# Patient Record
Sex: FEMALE | Race: WHITE
[De-identification: names, ages, dates, MRNs, and addresses within clinical notes are randomized per-mention and may not be internally consistent; named-entity substitution may affect disease eponyms.]

---

## 2021-03-24 ENCOUNTER — HOSPITAL ENCOUNTER (OUTPATIENT)
Age: 31
End: 2021-03-24
Payer: MEDICAID

## 2021-03-24 DIAGNOSIS — R20.2: Primary | ICD-10-CM

## 2021-03-24 PROCEDURE — 95886 MUSC TEST DONE W/N TEST COMP: CPT

## 2021-03-24 PROCEDURE — 95913 NRV CNDJ TEST 13/> STUDIES: CPT

## 2023-06-12 ENCOUNTER — OFFICE VISIT (OUTPATIENT)
Dept: PRIMARY CARE | Facility: CLINIC | Age: 33
End: 2023-06-12
Payer: MEDICAID

## 2023-06-12 ENCOUNTER — LAB (OUTPATIENT)
Dept: LAB | Facility: LAB | Age: 33
End: 2023-06-12
Payer: MEDICAID

## 2023-06-12 VITALS
SYSTOLIC BLOOD PRESSURE: 132 MMHG | HEIGHT: 66 IN | DIASTOLIC BLOOD PRESSURE: 78 MMHG | BODY MASS INDEX: 27.16 KG/M2 | WEIGHT: 169 LBS | HEART RATE: 96 BPM

## 2023-06-12 DIAGNOSIS — F41.9 ANXIETY: ICD-10-CM

## 2023-06-12 DIAGNOSIS — R10.84 GENERALIZED ABDOMINAL PAIN: ICD-10-CM

## 2023-06-12 DIAGNOSIS — M47.22 OSTEOARTHRITIS OF SPINE WITH RADICULOPATHY, CERVICAL REGION: ICD-10-CM

## 2023-06-12 DIAGNOSIS — R20.2 ARM PARESTHESIA, LEFT: ICD-10-CM

## 2023-06-12 DIAGNOSIS — M25.50 ARTHRALGIA, UNSPECIFIED JOINT: Primary | ICD-10-CM

## 2023-06-12 DIAGNOSIS — K21.9 GASTROESOPHAGEAL REFLUX DISEASE WITHOUT ESOPHAGITIS: ICD-10-CM

## 2023-06-12 DIAGNOSIS — M25.50 ARTHRALGIA, UNSPECIFIED JOINT: ICD-10-CM

## 2023-06-12 LAB
ALANINE AMINOTRANSFERASE (SGPT) (U/L) IN SER/PLAS: 13 U/L (ref 7–45)
ALBUMIN (G/DL) IN SER/PLAS: 4.7 G/DL (ref 3.4–5)
ALKALINE PHOSPHATASE (U/L) IN SER/PLAS: 53 U/L (ref 33–110)
ANION GAP IN SER/PLAS: 11 MMOL/L (ref 10–20)
ASPARTATE AMINOTRANSFERASE (SGOT) (U/L) IN SER/PLAS: 15 U/L (ref 9–39)
BACTERIA, URINE: ABNORMAL /HPF
BASOPHILS (10*3/UL) IN BLOOD BY AUTOMATED COUNT: 0.05 X10E9/L (ref 0–0.1)
BASOPHILS/100 LEUKOCYTES IN BLOOD BY AUTOMATED COUNT: 0.7 % (ref 0–2)
BILIRUBIN TOTAL (MG/DL) IN SER/PLAS: 0.4 MG/DL (ref 0–1.2)
CALCIUM (MG/DL) IN SER/PLAS: 9.5 MG/DL (ref 8.6–10.3)
CARBON DIOXIDE, TOTAL (MMOL/L) IN SER/PLAS: 29 MMOL/L (ref 21–32)
CHLORIDE (MMOL/L) IN SER/PLAS: 101 MMOL/L (ref 98–107)
COBALAMIN (VITAMIN B12) (PG/ML) IN SER/PLAS: 332 PG/ML (ref 211–911)
CREATININE (MG/DL) IN SER/PLAS: 0.72 MG/DL (ref 0.5–1.05)
EOSINOPHILS (10*3/UL) IN BLOOD BY AUTOMATED COUNT: 0.05 X10E9/L (ref 0–0.7)
EOSINOPHILS/100 LEUKOCYTES IN BLOOD BY AUTOMATED COUNT: 0.7 % (ref 0–6)
ERYTHROCYTE DISTRIBUTION WIDTH (RATIO) BY AUTOMATED COUNT: 12.3 % (ref 11.5–14.5)
ERYTHROCYTE MEAN CORPUSCULAR HEMOGLOBIN CONCENTRATION (G/DL) BY AUTOMATED: 33.7 G/DL (ref 32–36)
ERYTHROCYTE MEAN CORPUSCULAR VOLUME (FL) BY AUTOMATED COUNT: 95 FL (ref 80–100)
ERYTHROCYTES (10*6/UL) IN BLOOD BY AUTOMATED COUNT: 4.11 X10E12/L (ref 4–5.2)
GFR FEMALE: >90 ML/MIN/1.73M2
GLUCOSE (MG/DL) IN SER/PLAS: 80 MG/DL (ref 74–99)
HEMATOCRIT (%) IN BLOOD BY AUTOMATED COUNT: 39.2 % (ref 36–46)
HEMOGLOBIN (G/DL) IN BLOOD: 13.2 G/DL (ref 12–16)
IMMATURE GRANULOCYTES/100 LEUKOCYTES IN BLOOD BY AUTOMATED COUNT: 0.3 % (ref 0–0.9)
LEUKOCYTES (10*3/UL) IN BLOOD BY AUTOMATED COUNT: 7.4 X10E9/L (ref 4.4–11.3)
LYMPHOCYTES (10*3/UL) IN BLOOD BY AUTOMATED COUNT: 2.59 X10E9/L (ref 1.2–4.8)
LYMPHOCYTES/100 LEUKOCYTES IN BLOOD BY AUTOMATED COUNT: 34.9 % (ref 13–44)
MONOCYTES (10*3/UL) IN BLOOD BY AUTOMATED COUNT: 0.27 X10E9/L (ref 0.1–1)
MONOCYTES/100 LEUKOCYTES IN BLOOD BY AUTOMATED COUNT: 3.6 % (ref 2–10)
MUCUS, URINE: ABNORMAL /LPF
NEUTROPHILS (10*3/UL) IN BLOOD BY AUTOMATED COUNT: 4.44 X10E9/L (ref 1.2–7.7)
NEUTROPHILS/100 LEUKOCYTES IN BLOOD BY AUTOMATED COUNT: 59.8 % (ref 40–80)
PLATELETS (10*3/UL) IN BLOOD AUTOMATED COUNT: 310 X10E9/L (ref 150–450)
POTASSIUM (MMOL/L) IN SER/PLAS: 3.8 MMOL/L (ref 3.5–5.3)
PROTEIN TOTAL: 7 G/DL (ref 6.4–8.2)
RBC, URINE: ABNORMAL /HPF (ref 0–5)
RENAL EPITHELIAL CELLS, URINE: <1 /HPF
SODIUM (MMOL/L) IN SER/PLAS: 137 MMOL/L (ref 136–145)
SQUAMOUS EPITHELIAL CELLS, URINE: 6 /HPF
THYROTROPIN (MIU/L) IN SER/PLAS BY DETECTION LIMIT <= 0.05 MIU/L: 1.49 MIU/L (ref 0.44–3.98)
UREA NITROGEN (MG/DL) IN SER/PLAS: 13 MG/DL (ref 6–23)
WBC, URINE: 1 /HPF (ref 0–5)

## 2023-06-12 PROCEDURE — 81001 URINALYSIS AUTO W/SCOPE: CPT

## 2023-06-12 PROCEDURE — 86039 ANTINUCLEAR ANTIBODIES (ANA): CPT

## 2023-06-12 PROCEDURE — 86431 RHEUMATOID FACTOR QUANT: CPT

## 2023-06-12 PROCEDURE — 85025 COMPLETE CBC W/AUTO DIFF WBC: CPT

## 2023-06-12 PROCEDURE — 83036 HEMOGLOBIN GLYCOSYLATED A1C: CPT

## 2023-06-12 PROCEDURE — 80053 COMPREHEN METABOLIC PANEL: CPT

## 2023-06-12 PROCEDURE — 86038 ANTINUCLEAR ANTIBODIES: CPT

## 2023-06-12 PROCEDURE — 86225 DNA ANTIBODY NATIVE: CPT

## 2023-06-12 PROCEDURE — 82607 VITAMIN B-12: CPT

## 2023-06-12 PROCEDURE — 86235 NUCLEAR ANTIGEN ANTIBODY: CPT

## 2023-06-12 PROCEDURE — 36415 COLL VENOUS BLD VENIPUNCTURE: CPT

## 2023-06-12 PROCEDURE — 1036F TOBACCO NON-USER: CPT | Performed by: INTERNAL MEDICINE

## 2023-06-12 PROCEDURE — 86200 CCP ANTIBODY: CPT

## 2023-06-12 PROCEDURE — 99204 OFFICE O/P NEW MOD 45 MIN: CPT | Performed by: INTERNAL MEDICINE

## 2023-06-12 PROCEDURE — 84443 ASSAY THYROID STIM HORMONE: CPT

## 2023-06-12 PROCEDURE — 87086 URINE CULTURE/COLONY COUNT: CPT

## 2023-06-12 RX ORDER — GABAPENTIN 100 MG/1
100 CAPSULE ORAL 3 TIMES DAILY
COMMUNITY
Start: 2023-02-03 | End: 2023-07-24 | Stop reason: ALTCHOICE

## 2023-06-12 RX ORDER — GABAPENTIN 100 MG/1
100 CAPSULE ORAL 3 TIMES DAILY
Qty: 90 CAPSULE | Refills: 5 | Status: CANCELLED | OUTPATIENT
Start: 2023-06-12

## 2023-06-12 RX ORDER — OMEPRAZOLE 40 MG/1
40 CAPSULE, DELAYED RELEASE ORAL
Qty: 30 CAPSULE | Refills: 11 | Status: SHIPPED | OUTPATIENT
Start: 2023-06-12 | End: 2024-05-16

## 2023-06-12 RX ORDER — PREGABALIN 100 MG/1
100 CAPSULE ORAL 3 TIMES DAILY
Qty: 90 CAPSULE | Refills: 2 | Status: SHIPPED | OUTPATIENT
Start: 2023-06-12 | End: 2023-09-26

## 2023-06-12 RX ORDER — SERTRALINE HYDROCHLORIDE 100 MG/1
100 TABLET, FILM COATED ORAL 2 TIMES DAILY
COMMUNITY
Start: 2023-02-03 | End: 2023-06-12 | Stop reason: SDUPTHER

## 2023-06-12 RX ORDER — CYCLOBENZAPRINE HCL 10 MG
10 TABLET ORAL 3 TIMES DAILY PRN
COMMUNITY
Start: 2023-02-03 | End: 2023-06-12 | Stop reason: SDUPTHER

## 2023-06-12 RX ORDER — SERTRALINE HYDROCHLORIDE 100 MG/1
200 TABLET, FILM COATED ORAL DAILY
Qty: 60 TABLET | Refills: 1 | Status: SHIPPED | OUTPATIENT
Start: 2023-06-12 | End: 2023-09-26

## 2023-06-12 RX ORDER — CYCLOBENZAPRINE HCL 10 MG
10 TABLET ORAL 3 TIMES DAILY PRN
Qty: 90 TABLET | Refills: 3 | Status: SHIPPED | OUTPATIENT
Start: 2023-06-12 | End: 2023-10-27

## 2023-06-12 ASSESSMENT — ENCOUNTER SYMPTOMS
UNEXPECTED WEIGHT CHANGE: 0
COUGH: 0
DIARRHEA: 0
NERVOUS/ANXIOUS: 0
CONSTIPATION: 0
WEAKNESS: 0
RHINORRHEA: 0
ABDOMINAL PAIN: 1
SORE THROAT: 0
HALLUCINATIONS: 0
DYSURIA: 0
FLANK PAIN: 1
ARTHRALGIAS: 0
FEVER: 0
BACK PAIN: 0
TROUBLE SWALLOWING: 0
ABDOMINAL DISTENTION: 0
NUMBNESS: 0
SHORTNESS OF BREATH: 0
FREQUENCY: 0
APPETITE CHANGE: 0
BLOOD IN STOOL: 0
HEADACHES: 0
NAUSEA: 1
FATIGUE: 0
VOMITING: 0
SINUS PRESSURE: 0
NECK PAIN: 0
EYE DISCHARGE: 0
DIZZINESS: 0
ACTIVITY CHANGE: 0

## 2023-06-12 ASSESSMENT — PATIENT HEALTH QUESTIONNAIRE - PHQ9
2. FEELING DOWN, DEPRESSED OR HOPELESS: NOT AT ALL
SUM OF ALL RESPONSES TO PHQ9 QUESTIONS 1 AND 2: 0
1. LITTLE INTEREST OR PLEASURE IN DOING THINGS: NOT AT ALL

## 2023-06-12 NOTE — PROGRESS NOTES
"Subjective   Patient ID: Antionette Curtis is a 33 y.o. female who presents for Establish Care ([facility]).  HPI    Patient is a 33 y.o. female patient who is here today to establish care with a chief complaint of nausea. She reports that she has been having nausea that comes and goes, more frequent if she drinks more caffeine, has tried changing her diet.   Patient reports some flank pain.   She tried cutting back on caffeine which did help but did resolve.     She has chronic back pain, currently on flexeril, and gabpanetin.  Patient had bene having issues with dropping things. Patient had an MRI, sees chiropractoer. She staets she feels like the gabapentin gives her RLS.   She did see Dr Rosas, recommend shots but she declined.   She did complete a course of physical therapy.     Review of Systems   Constitutional:  Negative for activity change, appetite change, fatigue, fever and unexpected weight change.   HENT:  Negative for congestion, ear discharge, ear pain, nosebleeds, postnasal drip, rhinorrhea, sinus pressure, sneezing, sore throat, tinnitus and trouble swallowing.    Eyes:  Negative for discharge.   Respiratory:  Negative for cough and shortness of breath.    Cardiovascular:  Negative for chest pain.   Gastrointestinal:  Positive for abdominal pain and nausea. Negative for abdominal distention, blood in stool, constipation, diarrhea and vomiting.   Endocrine: Negative for cold intolerance.   Genitourinary:  Positive for flank pain. Negative for dysuria and frequency.   Musculoskeletal:  Negative for arthralgias, back pain and neck pain.   Skin:  Negative for rash.   Neurological:  Negative for dizziness, weakness, numbness and headaches.   Psychiatric/Behavioral:  Negative for hallucinations. The patient is not nervous/anxious.        Objective   /78 (BP Location: Left arm, Patient Position: Sitting, BP Cuff Size: Adult)   Pulse 96   Ht 1.676 m (5' 6\")   Wt 76.7 kg (169 lb)   BMI 27.28 kg/m² "     Physical Exam  Constitutional:       General: She is not in acute distress.     Appearance: Normal appearance. She is not toxic-appearing.   HENT:      Head: Normocephalic and atraumatic.      Nose: Nose normal.      Mouth/Throat:      Mouth: Mucous membranes are moist.      Pharynx: Oropharynx is clear.   Eyes:      Extraocular Movements: Extraocular movements intact.      Conjunctiva/sclera: Conjunctivae normal.      Pupils: Pupils are equal, round, and reactive to light.   Cardiovascular:      Rate and Rhythm: Normal rate and regular rhythm.      Heart sounds: No murmur heard.     No friction rub. No gallop.   Pulmonary:      Effort: Pulmonary effort is normal.      Breath sounds: Normal breath sounds.   Abdominal:      General: Bowel sounds are normal. There is no distension.      Palpations: Abdomen is soft. There is no mass.      Tenderness: There is no abdominal tenderness. There is no guarding.   Musculoskeletal:      Cervical back: Normal range of motion.   Skin:     General: Skin is warm and dry.   Neurological:      General: No focal deficit present.      Mental Status: She is alert and oriented to person, place, and time.   Psychiatric:         Mood and Affect: Mood normal.         Thought Content: Thought content normal.         Judgment: Judgment normal.           Assessment/Plan   Problem List Items Addressed This Visit          Nervous    Osteoarthritis of spine with radiculopathy, cervical region    Relevant Medications    pregabalin (Lyrica) 100 mg capsule    Arm paresthesia, left    Relevant Orders    Vitamin B12    Hemoglobin A1C       Digestive    Gastroesophageal reflux disease without esophagitis    Relevant Medications    omeprazole (PriLOSEC) 40 mg DR capsule       Musculoskeletal    Arthralgia - Primary    Relevant Medications    cyclobenzaprine (Flexeril) 10 mg tablet    Other Relevant Orders    HALLEY with Reflex to SKYLA    Rheumatoid Factor    Citrulline Antibody, IgG       Other     Anxiety    Relevant Medications    sertraline (Zoloft) 100 mg tablet     Other Visit Diagnoses       Generalized abdominal pain        Relevant Orders    Comprehensive Metabolic Panel    CBC and Auto Differential    Urinalysis Microscopic Only    Urine Culture          Abd pain, flank pain   - suspect GERD, possibly PUD, gastritis   - will check cbc, cmp  - try omeprazole 40mg po daily     2. Chronic neck pain with radiculopathy  - reviewed MRI   - pt says she had EMG, AND SAW dR Rosas, declined cortisone shots   - declines pain management referral   - continue flexeril   - will try lyrica 100mg po tid instead of gabapentin     3. Anxiety, stable   - continue zoloft 100mg at bedtime     4. Pt states iud NEEDS removed in Jan, recommend gyn, reports periods are starting to come back, explained that this is normal as the hormone reaches the end of its lifespan     5. Back pain with radiculopathy   - consider imaging and pt but will address above issues first   - check rupal, rf,m anti-ccp, tsh    6. Follow up  in 1 mo with labs   Final diagnoses:   [M25.50] Arthralgia, unspecified joint   [M47.22] Osteoarthritis of spine with radiculopathy, cervical region   [F41.9] Anxiety   [R10.84] Generalized abdominal pain   [R20.2] Arm paresthesia, left   [K21.9] Gastroesophageal reflux disease without esophagitis

## 2023-06-13 LAB
CITRULLINE ANTIBODY, IGG: <1 U/ML
ESTIMATED AVERAGE GLUCOSE FOR HBA1C: 103 MG/DL
HEMOGLOBIN A1C/HEMOGLOBIN TOTAL IN BLOOD: 5.2 %
RHEUMATOID FACTOR (IU/ML) IN SERUM OR PLASMA: <10 IU/ML (ref 0–15)
URINE CULTURE: NORMAL

## 2023-06-14 LAB
ANA PATTERN: ABNORMAL
ANA TITER: ABNORMAL
ANTI-CENTROMERE: <0.2 AI
ANTI-CHROMATIN: <0.2 AI
ANTI-DNA (DS): 1 IU/ML
ANTI-JO-1 IGG: <0.2 AI
ANTI-NUCLEAR ANTIBODY (ANA): POSITIVE
ANTI-RIBOSOMAL P: <0.2 AI
ANTI-RNP: <0.2 AI
ANTI-SCL-70: <0.2 AI
ANTI-SM/RNP: <0.2 AI
ANTI-SM: 0.2 AI
ANTI-SSA: 0.2 AI
ANTI-SSB: <0.2 AI

## 2023-07-24 ENCOUNTER — OFFICE VISIT (OUTPATIENT)
Dept: PRIMARY CARE | Facility: CLINIC | Age: 33
End: 2023-07-24
Payer: MEDICAID

## 2023-07-24 VITALS
HEIGHT: 66 IN | HEART RATE: 99 BPM | DIASTOLIC BLOOD PRESSURE: 80 MMHG | WEIGHT: 173 LBS | BODY MASS INDEX: 27.8 KG/M2 | SYSTOLIC BLOOD PRESSURE: 128 MMHG

## 2023-07-24 DIAGNOSIS — G89.29 CHRONIC BILATERAL LOW BACK PAIN WITH BILATERAL SCIATICA: ICD-10-CM

## 2023-07-24 DIAGNOSIS — F41.9 ANXIETY: ICD-10-CM

## 2023-07-24 DIAGNOSIS — M47.22 OSTEOARTHRITIS OF SPINE WITH RADICULOPATHY, CERVICAL REGION: ICD-10-CM

## 2023-07-24 DIAGNOSIS — R20.2 ARM PARESTHESIA, LEFT: ICD-10-CM

## 2023-07-24 DIAGNOSIS — M54.42 CHRONIC BILATERAL LOW BACK PAIN WITH BILATERAL SCIATICA: ICD-10-CM

## 2023-07-24 DIAGNOSIS — M54.41 CHRONIC BILATERAL LOW BACK PAIN WITH BILATERAL SCIATICA: ICD-10-CM

## 2023-07-24 DIAGNOSIS — K21.9 GASTROESOPHAGEAL REFLUX DISEASE WITHOUT ESOPHAGITIS: Primary | ICD-10-CM

## 2023-07-24 PROCEDURE — 1036F TOBACCO NON-USER: CPT | Performed by: INTERNAL MEDICINE

## 2023-07-24 PROCEDURE — 99213 OFFICE O/P EST LOW 20 MIN: CPT | Performed by: INTERNAL MEDICINE

## 2023-07-24 ASSESSMENT — ENCOUNTER SYMPTOMS
BACK PAIN: 1
NUMBNESS: 1

## 2023-07-24 NOTE — PROGRESS NOTES
"Subjective   Patient ID: Antionette Curtis is a 33 y.o. female who presents for Follow-up (1 month).  HPI  Patient is here today for 1 mo follow up   Pt had her bloodwork completed, all wnl, except positive RUPAL but antibodies negative.   Abd pain is improved on the omeprazole.     Review of Systems   Musculoskeletal:  Positive for back pain.   Neurological:  Positive for numbness.       Objective   /80 (BP Location: Left arm, Patient Position: Sitting, BP Cuff Size: Adult)   Pulse 99   Ht 1.676 m (5' 6\")   Wt 78.5 kg (173 lb)   BMI 27.92 kg/m²     Physical Exam  Constitutional:       General: She is not in acute distress.     Appearance: Normal appearance.   Cardiovascular:      Rate and Rhythm: Normal rate and regular rhythm.      Pulses: Normal pulses.   Pulmonary:      Effort: Pulmonary effort is normal. No respiratory distress.      Breath sounds: Normal breath sounds.   Neurological:      Mental Status: She is alert.   Psychiatric:         Mood and Affect: Mood normal.         Behavior: Behavior normal.         Thought Content: Thought content normal.           Assessment/Plan   Problem List Items Addressed This Visit       Osteoarthritis of spine with radiculopathy, cervical region    Anxiety    Arm paresthesia, left    Gastroesophageal reflux disease without esophagitis - Primary     Abd pain, flank pain, better   - suspect GERD, possibly PUD, gastritis   - labs wnl   - continue omeprazole 40mg po daily      2. Chronic neck pain with radiculopathy  - reviewed MRI   - pt says she had EMG, AND SAW dR Rosas, declined cortisone shots   - declines pain management referral   - continue flexeril   - improved on lyrica 100mg po tid      3. Anxiety, stable   - continue zoloft 100mg at bedtime      4.. Back pain with radiculopathy   - rupal pos, rf negative   - continue flexeril and lyrica   - xrays ordered    - can add once daily aleve prn   Final diagnoses:   [K21.9] Gastroesophageal reflux disease without " esophagitis   [F41.9] Anxiety   [M47.22] Osteoarthritis of spine with radiculopathy, cervical region   [R20.2] Arm paresthesia, left

## 2023-09-26 DIAGNOSIS — F41.9 ANXIETY: ICD-10-CM

## 2023-09-26 DIAGNOSIS — M47.22 OSTEOARTHRITIS OF SPINE WITH RADICULOPATHY, CERVICAL REGION: ICD-10-CM

## 2023-09-26 RX ORDER — SERTRALINE HYDROCHLORIDE 100 MG/1
200 TABLET, FILM COATED ORAL DAILY
Qty: 60 TABLET | Refills: 1 | Status: SHIPPED | OUTPATIENT
Start: 2023-09-26 | End: 2024-04-02 | Stop reason: SDUPTHER

## 2023-09-26 RX ORDER — PREGABALIN 100 MG/1
100 CAPSULE ORAL 3 TIMES DAILY
Qty: 90 CAPSULE | Refills: 2 | Status: SHIPPED | OUTPATIENT
Start: 2023-09-26 | End: 2023-10-30 | Stop reason: SDUPTHER

## 2023-10-26 DIAGNOSIS — M25.50 ARTHRALGIA, UNSPECIFIED JOINT: ICD-10-CM

## 2023-10-27 RX ORDER — CYCLOBENZAPRINE HCL 10 MG
10 TABLET ORAL 3 TIMES DAILY PRN
Qty: 90 TABLET | Refills: 3 | Status: SHIPPED | OUTPATIENT
Start: 2023-10-27 | End: 2024-04-02 | Stop reason: SDUPTHER

## 2023-10-30 ENCOUNTER — APPOINTMENT (OUTPATIENT)
Dept: PRIMARY CARE | Facility: CLINIC | Age: 33
End: 2023-10-30
Payer: MEDICAID

## 2023-10-30 DIAGNOSIS — M47.22 OSTEOARTHRITIS OF SPINE WITH RADICULOPATHY, CERVICAL REGION: ICD-10-CM

## 2023-10-30 RX ORDER — PREGABALIN 100 MG/1
100 CAPSULE ORAL 3 TIMES DAILY
Qty: 90 CAPSULE | Refills: 2 | Status: SHIPPED | OUTPATIENT
Start: 2023-10-30 | End: 2023-11-27

## 2023-11-25 DIAGNOSIS — M47.22 OSTEOARTHRITIS OF SPINE WITH RADICULOPATHY, CERVICAL REGION: ICD-10-CM

## 2023-11-27 RX ORDER — PREGABALIN 100 MG/1
100 CAPSULE ORAL 3 TIMES DAILY
Qty: 90 CAPSULE | Refills: 2 | Status: SHIPPED | OUTPATIENT
Start: 2023-11-27 | End: 2024-04-02 | Stop reason: SDUPTHER

## 2023-12-11 ENCOUNTER — OFFICE VISIT (OUTPATIENT)
Dept: PRIMARY CARE | Facility: CLINIC | Age: 33
End: 2023-12-11
Payer: MEDICAID

## 2023-12-11 VITALS
WEIGHT: 188 LBS | BODY MASS INDEX: 30.22 KG/M2 | DIASTOLIC BLOOD PRESSURE: 75 MMHG | HEIGHT: 66 IN | HEART RATE: 92 BPM | SYSTOLIC BLOOD PRESSURE: 117 MMHG

## 2023-12-11 DIAGNOSIS — M54.2 CERVICALGIA: ICD-10-CM

## 2023-12-11 DIAGNOSIS — K21.9 GASTROESOPHAGEAL REFLUX DISEASE WITHOUT ESOPHAGITIS: ICD-10-CM

## 2023-12-11 DIAGNOSIS — F41.9 ANXIETY: Primary | ICD-10-CM

## 2023-12-11 PROCEDURE — 1036F TOBACCO NON-USER: CPT | Performed by: INTERNAL MEDICINE

## 2023-12-11 PROCEDURE — 99214 OFFICE O/P EST MOD 30 MIN: CPT | Performed by: INTERNAL MEDICINE

## 2023-12-11 ASSESSMENT — ENCOUNTER SYMPTOMS: NUMBNESS: 1

## 2023-12-11 NOTE — PROGRESS NOTES
"Subjective   Patient ID: Antionette Curtis is a 33 y.o. female who presents for Follow-up (3 month/Denies changes in medications ).  HPI  Patient is here today for 3 mo follow up   Reports that her son is having more seizures, he is currently hospitalized in [facility] for EEG monitoring.   Reports that she is having more numbness in her hands but no neck pain.       Review of Systems   Neurological:  Positive for numbness.       Objective   /75   Pulse 92   Ht 1.676 m (5' 6\")   Wt 85.3 kg (188 lb)   BMI 30.34 kg/m²     Physical Exam  Constitutional:       General: She is not in acute distress.     Appearance: Normal appearance.   HENT:      Head: Normocephalic.      Nose: Nose normal.      Mouth/Throat:      Pharynx: No oropharyngeal exudate.   Eyes:      General:         Right eye: No discharge.         Left eye: No discharge.      Extraocular Movements: Extraocular movements intact.      Pupils: Pupils are equal, round, and reactive to light.   Cardiovascular:      Rate and Rhythm: Normal rate and regular rhythm.      Heart sounds: No murmur heard.     No gallop.   Pulmonary:      Effort: Pulmonary effort is normal. No respiratory distress.      Breath sounds: Normal breath sounds. No wheezing.   Musculoskeletal:         General: No swelling. Normal range of motion.   Skin:     General: Skin is warm and dry.      Coloration: Skin is not jaundiced.   Neurological:      General: No focal deficit present.      Mental Status: She is alert and oriented to person, place, and time.      Cranial Nerves: No cranial nerve deficit.   Psychiatric:         Mood and Affect: Mood normal.         Behavior: Behavior normal.           Assessment/Plan   Problem List Items Addressed This Visit       Anxiety - Primary    Gastroesophageal reflux disease without esophagitis    Cervicalgia     GERd   - continue omeprazole 40mg po daily      2. Chronic neck pain with radiculopathy, back pain with radiculopathy  - rupal pos, rf " negative  - reviewed MRI   - pt says she had EMG, AND SAW dR Rosas, declined cortisone shots   - declines pain management referral, declines PT referral   - continue flexeril   - continue lyrica 100mg po tid   - SHE IS TO CALL IF PAIN WORSENS AND would refer back for injections and try pt again     3. Anxiety, stable   - continue zoloft 100mg at bedtime      Final diagnoses:   [F41.9] Anxiety   [M54.2] Cervicalgia   [K21.9] Gastroesophageal reflux disease without esophagitis

## 2024-01-05 ENCOUNTER — OFFICE VISIT (OUTPATIENT)
Dept: OBSTETRICS AND GYNECOLOGY | Facility: CLINIC | Age: 34
End: 2024-01-05
Payer: MEDICAID

## 2024-01-05 VITALS
HEIGHT: 66 IN | BODY MASS INDEX: 31.12 KG/M2 | WEIGHT: 193.6 LBS | SYSTOLIC BLOOD PRESSURE: 112 MMHG | DIASTOLIC BLOOD PRESSURE: 64 MMHG

## 2024-01-05 DIAGNOSIS — Z12.4 ENCOUNTER FOR PAPANICOLAOU SMEAR FOR CERVICAL CANCER SCREENING: Primary | ICD-10-CM

## 2024-01-05 DIAGNOSIS — Z01.419 ENCOUNTER FOR ANNUAL ROUTINE GYNECOLOGICAL EXAMINATION: ICD-10-CM

## 2024-01-05 PROCEDURE — 99395 PREV VISIT EST AGE 18-39: CPT | Performed by: OBSTETRICS & GYNECOLOGY

## 2024-01-05 PROCEDURE — 88175 CYTOPATH C/V AUTO FLUID REDO: CPT

## 2024-01-05 PROCEDURE — 1036F TOBACCO NON-USER: CPT | Performed by: OBSTETRICS & GYNECOLOGY

## 2024-01-05 NOTE — PROGRESS NOTES
Antionette Curtis is a 33 y.o. female who is here for a routine exam. PCP = Nemo El DO    Chief Complaint   Patient presents with    Gynecologic Exam     Patient is here for yearly exam and pap test. Patient does not do regular self breast exams and has no concerns at this time. LMP: IUD          Presents for annual exam. She voices no complaints and is doing well. Denies any bowel or bladder problems. Denies any breast problems.  She had the Mirena IUD placed in 2019.    OB History          2    Para   1    Term   1       0    AB   1    Living   1         SAB   0    IAB   1    Ectopic   0    Multiple   0    Live Births   1                  Social History     Substance and Sexual Activity   Sexual Activity Not on file     Current contraception: IUD    Past Medical History:   Diagnosis Date    Anxiety     Encounter for gynecological examination (general) (routine) without abnormal findings     Pap test, as part of routine gynecological examination    Other conditions influencing health status     History of vaginal delivery    Other conditions influencing health status     Menarche    Other specified postprocedural states     History of elective     Presence of (intrauterine) contraceptive device     IUD (intrauterine device) in place       Past Surgical History:   Procedure Laterality Date    OTHER SURGICAL HISTORY  2020    No history of surgery       Past med hx and past surg hx reviewed and notable for: none    Review of Systems:   Constitutional: No fever or chills  Respiratory: No shortness of breath, or cough  Cardiovascular: No chest pain or syncope  Breasts: No breast pain, no masses, no nipple discharge  Gastrointestinal: No nausea, vomiting, or diarrhea, no abdominal pain  Genitourinary: No dysuria or frequency  Gynecology: Negative except as noted in history of present illness  All other: All other systems reviewed and negative for complaint    Objective   /64    "Ht 1.676 m (5' 6\")   Wt 87.8 kg (193 lb 9.6 oz)   BMI 31.25 kg/m²     PHYSICAL EXAMINATION:  Well-developed, well nourished, in no acute distress, alert and oriented x three, is pleasant and cooperative.   HEENT: Clear. Pupils equal, round and reactive to light and accommodation. Extraocular muscles are intact. Oral mucosa pink without exudate.   NECK: No lymphadenopathy, no thyromegaly.  BREASTS: Symmetric, no palpable masses. No nipple discharge or retraction.  LUNGS: Clear bilaterally.  HEART: Regular rate and rhythm without murmurs.  ABDOMEN: Normoactive bowel sounds, soft and nontender, no guarding or rebound tenderness, no CVA tenderness.  EXTREMITIES: No clubbing, cyanosis or edema.  NEUROLOGIC:  Cranial nerves II-XII grossly intact.  :  Normal external female genitalia, normal vulva, normal vagina. Normal urethral meatus, urethra and bladder. Normal appearing cervix.  The IUD string seen at the cervix.  Normal-sized uterus, no adnexal masses or tenderness. Pap smear performed today.      Actions performed during this visit include:  - Clinical breast exam  - Clinical pelvic exam  - No orders of the defined types were placed in this encounter.       Problem List Items Addressed This Visit    None  Visit Diagnoses       Encounter for Papanicolaou smear for cervical cancer screening    -  Primary    Relevant Orders    THINPREP PAP TEST    Encounter for annual routine gynecological examination        Relevant Orders    THINPREP PAP TEST             Provider Impression:  1.  Annual  2.  Contraceptive counseling  Patient informed that the Mirena IUD is now good for 8 years for contraception.  Patient's mother was treated for breast cancer and did have the genetic testing which shows that her mother is negative for the BRCA gene.  Patient declines genetic counseling.    Thank you for coming to your annual exam. Your findings during the exam were normal.  Please return for your next visit in 1 year.  "

## 2024-01-19 ENCOUNTER — TELEPHONE (OUTPATIENT)
Dept: OBSTETRICS AND GYNECOLOGY | Facility: CLINIC | Age: 34
End: 2024-01-19
Payer: MEDICAID

## 2024-01-19 LAB
CYTOLOGY CMNT CVX/VAG CYTO-IMP: NORMAL
LAB AP CONTRACEPTIVE HISTORY: NORMAL
LAB AP HPV GENOTYPE QUESTION: YES
LAB AP HPV HR: NORMAL
LABORATORY COMMENT REPORT: NORMAL
PATH REPORT.TOTAL CANCER: NORMAL

## 2024-01-19 NOTE — RESULT ENCOUNTER NOTE
Please inform patient that the Pap smear is negative.  Noted BV, can treat with Flagyl if symptomatic

## 2024-01-19 NOTE — TELEPHONE ENCOUNTER
----- Message from Te Snyder MD sent at 1/19/2024  8:09 AM EST -----  Please inform patient that the Pap smear is negative.  Noted BV, can treat with Flagyl if symptomatic

## 2024-04-02 DIAGNOSIS — M47.22 OSTEOARTHRITIS OF SPINE WITH RADICULOPATHY, CERVICAL REGION: ICD-10-CM

## 2024-04-02 DIAGNOSIS — F41.9 ANXIETY: ICD-10-CM

## 2024-04-02 DIAGNOSIS — M25.50 ARTHRALGIA, UNSPECIFIED JOINT: ICD-10-CM

## 2024-04-02 RX ORDER — CYCLOBENZAPRINE HCL 10 MG
10 TABLET ORAL 3 TIMES DAILY PRN
Qty: 90 TABLET | Refills: 3 | Status: SHIPPED | OUTPATIENT
Start: 2024-04-02

## 2024-04-02 RX ORDER — PREGABALIN 100 MG/1
100 CAPSULE ORAL 3 TIMES DAILY
Qty: 90 CAPSULE | Refills: 5 | Status: SHIPPED | OUTPATIENT
Start: 2024-04-02

## 2024-04-02 RX ORDER — SERTRALINE HYDROCHLORIDE 100 MG/1
200 TABLET, FILM COATED ORAL DAILY
Qty: 180 TABLET | Refills: 3 | Status: SHIPPED | OUTPATIENT
Start: 2024-04-02

## 2024-05-16 DIAGNOSIS — K21.9 GASTROESOPHAGEAL REFLUX DISEASE WITHOUT ESOPHAGITIS: ICD-10-CM

## 2024-05-16 RX ORDER — OMEPRAZOLE 40 MG/1
CAPSULE, DELAYED RELEASE ORAL
Qty: 30 CAPSULE | Refills: 11 | Status: SHIPPED | OUTPATIENT
Start: 2024-05-16

## 2024-06-17 ENCOUNTER — APPOINTMENT (OUTPATIENT)
Dept: PRIMARY CARE | Facility: CLINIC | Age: 34
End: 2024-06-17
Payer: MEDICAID

## 2024-06-17 VITALS
BODY MASS INDEX: 29.89 KG/M2 | DIASTOLIC BLOOD PRESSURE: 81 MMHG | HEART RATE: 106 BPM | SYSTOLIC BLOOD PRESSURE: 117 MMHG | HEIGHT: 66 IN | WEIGHT: 186 LBS

## 2024-06-17 DIAGNOSIS — K21.9 GASTROESOPHAGEAL REFLUX DISEASE WITHOUT ESOPHAGITIS: ICD-10-CM

## 2024-06-17 DIAGNOSIS — M54.2 CERVICALGIA: ICD-10-CM

## 2024-06-17 DIAGNOSIS — G89.29 CHRONIC BILATERAL LOW BACK PAIN WITHOUT SCIATICA: ICD-10-CM

## 2024-06-17 DIAGNOSIS — M54.50 CHRONIC BILATERAL LOW BACK PAIN WITHOUT SCIATICA: ICD-10-CM

## 2024-06-17 DIAGNOSIS — M47.22 OSTEOARTHRITIS OF SPINE WITH RADICULOPATHY, CERVICAL REGION: ICD-10-CM

## 2024-06-17 DIAGNOSIS — F41.9 ANXIETY: Primary | ICD-10-CM

## 2024-06-17 DIAGNOSIS — M25.50 ARTHRALGIA, UNSPECIFIED JOINT: ICD-10-CM

## 2024-06-17 PROCEDURE — 1036F TOBACCO NON-USER: CPT | Performed by: INTERNAL MEDICINE

## 2024-06-17 PROCEDURE — 99214 OFFICE O/P EST MOD 30 MIN: CPT | Performed by: INTERNAL MEDICINE

## 2024-06-17 RX ORDER — OMEPRAZOLE 40 MG/1
CAPSULE, DELAYED RELEASE ORAL
Qty: 30 CAPSULE | Refills: 11 | Status: SHIPPED | OUTPATIENT
Start: 2024-06-17

## 2024-06-17 RX ORDER — BUSPIRONE HYDROCHLORIDE 5 MG/1
5 TABLET ORAL 3 TIMES DAILY
Qty: 90 TABLET | Refills: 1 | Status: SHIPPED | OUTPATIENT
Start: 2024-06-17 | End: 2025-06-17

## 2024-06-17 RX ORDER — SERTRALINE HYDROCHLORIDE 100 MG/1
200 TABLET, FILM COATED ORAL DAILY
Qty: 180 TABLET | Refills: 3 | Status: SHIPPED | OUTPATIENT
Start: 2024-06-17

## 2024-06-17 RX ORDER — CYCLOBENZAPRINE HCL 10 MG
10 TABLET ORAL 3 TIMES DAILY PRN
Qty: 90 TABLET | Refills: 3 | Status: SHIPPED | OUTPATIENT
Start: 2024-06-17

## 2024-06-17 ASSESSMENT — ENCOUNTER SYMPTOMS: NERVOUS/ANXIOUS: 1

## 2024-06-17 NOTE — PROGRESS NOTES
"Subjective   Patient ID: Antionette Curtis is a 34 y.o. female who presents for Follow-up (6 month).  HPI  Patient is here today for 6 mo follow up     Patient reports that she is doing ok.   She has had worsening anxiety , had a breakup since her last appt.     Review of Systems   Psychiatric/Behavioral:  The patient is nervous/anxious.        Objective   /81   Pulse 106   Ht 1.676 m (5' 6\")   Wt 84.4 kg (186 lb)   BMI 30.02 kg/m²   Physical Exam  Constitutional:       Appearance: Normal appearance.   Neurological:      Mental Status: She is alert.   Psychiatric:         Mood and Affect: Mood normal.         Behavior: Behavior normal.           Assessment/Plan   Problem List Items Addressed This Visit       Arthralgia    Relevant Medications    cyclobenzaprine (Flexeril) 10 mg tablet    Osteoarthritis of spine with radiculopathy, cervical region    Anxiety - Primary    Relevant Medications    busPIRone (Buspar) 5 mg tablet    sertraline (Zoloft) 100 mg tablet    Gastroesophageal reflux disease without esophagitis    Relevant Medications    omeprazole (PriLOSEC) 40 mg DR capsule    Cervicalgia     Other Visit Diagnoses       Chronic bilateral low back pain without sciatica        Relevant Orders    HLAB27 Typing    Comprehensive Metabolic Panel    CBC and Auto Differential    HALLEY with Reflex to SKYLA    Rheumatoid factor    Vitamin D 25-Hydroxy,Total (for eval of Vitamin D levels)    Vitamin B12    TSH with reflex to Free T4 if abnormal          GERd, controlled   - continue omeprazole 40mg po daily      2. 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 Anxiety, uncontrolled    - continue zoloft 200mg at bedtime   - consider changing to cymbalta if anxiety not improved in a few weeks   - will add buspar 5mg po tid   - call in 2 weeks with update     4. Follow up in 2 mo   Final diagnoses:   [F41.9] Anxiety   [M47.22] Osteoarthritis of spine with radiculopathy, cervical region   [K21.9] Gastroesophageal reflux disease without esophagitis   [M25.50] Arthralgia, unspecified joint   [M54.2] Cervicalgia   [M54.50, G89.29] Chronic bilateral low back pain without sciatica

## 2024-08-15 DIAGNOSIS — F41.9 ANXIETY: ICD-10-CM

## 2024-08-15 RX ORDER — BUSPIRONE HYDROCHLORIDE 5 MG/1
5 TABLET ORAL 3 TIMES DAILY
Qty: 90 TABLET | Refills: 1 | Status: SHIPPED | OUTPATIENT
Start: 2024-08-15

## 2024-08-26 ENCOUNTER — APPOINTMENT (OUTPATIENT)
Dept: PRIMARY CARE | Facility: CLINIC | Age: 34
End: 2024-08-26
Payer: MEDICAID

## 2024-08-26 VITALS
WEIGHT: 186 LBS | DIASTOLIC BLOOD PRESSURE: 80 MMHG | HEART RATE: 101 BPM | BODY MASS INDEX: 29.89 KG/M2 | HEIGHT: 66 IN | SYSTOLIC BLOOD PRESSURE: 118 MMHG

## 2024-08-26 DIAGNOSIS — K21.9 GASTROESOPHAGEAL REFLUX DISEASE WITHOUT ESOPHAGITIS: ICD-10-CM

## 2024-08-26 DIAGNOSIS — M47.22 OSTEOARTHRITIS OF SPINE WITH RADICULOPATHY, CERVICAL REGION: ICD-10-CM

## 2024-08-26 DIAGNOSIS — R20.2 ARM PARESTHESIA, LEFT: ICD-10-CM

## 2024-08-26 DIAGNOSIS — F41.9 ANXIETY: Primary | ICD-10-CM

## 2024-08-26 DIAGNOSIS — M54.2 CERVICALGIA: ICD-10-CM

## 2024-08-26 PROCEDURE — 1036F TOBACCO NON-USER: CPT | Performed by: INTERNAL MEDICINE

## 2024-08-26 PROCEDURE — 3008F BODY MASS INDEX DOCD: CPT | Performed by: INTERNAL MEDICINE

## 2024-08-26 PROCEDURE — 99214 OFFICE O/P EST MOD 30 MIN: CPT | Performed by: INTERNAL MEDICINE

## 2024-08-26 RX ORDER — DOXEPIN HYDROCHLORIDE 25 MG/1
25 CAPSULE ORAL NIGHTLY
Qty: 30 CAPSULE | Refills: 11 | Status: SHIPPED | OUTPATIENT
Start: 2024-08-26 | End: 2025-08-26

## 2024-08-26 ASSESSMENT — ENCOUNTER SYMPTOMS: NERVOUS/ANXIOUS: 1

## 2024-08-26 NOTE — PROGRESS NOTES
"Subjective   Patient ID: Antionette Curtis is a 34 y.o. female who presents for Follow-up (2 month).  HPI    Patient is here today for 3 mo follow up     Patient reports that her son had brain surgery last week for recurrent intractable seizures, he Is still in the hospital.     She states that she did not tolerate the buspar, she had headaches at the base of her head every time that she took it.     Review of Systems   Psychiatric/Behavioral:  The patient is nervous/anxious.        Objective   /80   Pulse 101   Ht 1.676 m (5' 6\")   Wt 84.4 kg (186 lb)   BMI 30.02 kg/m²     Physical Exam  Constitutional:       General: She is not in acute distress.     Appearance: Normal appearance.   Neurological:      Mental Status: She is alert.   Psychiatric:         Mood and Affect: Mood normal.         Behavior: Behavior normal.         Thought Content: Thought content normal.           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  Anxiety, uncontrolled    - continue zoloft 200mg at bedtime   - consider changing to cymbalta if anxiety not improved in a few weeks   - did not tolerate buspar, had headaches at the base of her head every time she took " it.    - will add vrylar   - try doxepin prn   - call in 2 weeks with update      3. Follow up in 2 mo   Final diagnoses:   [F41.9] Anxiety   [M54.2] Cervicalgia   [R20.2] Arm paresthesia, left   [M47.22] Osteoarthritis of spine with radiculopathy, cervical region   [K21.9] Gastroesophageal reflux disease without esophagitis

## 2024-09-03 ENCOUNTER — TELEPHONE (OUTPATIENT)
Dept: PRIMARY CARE | Facility: CLINIC | Age: 34
End: 2024-09-03
Payer: MEDICAID

## 2024-09-03 DIAGNOSIS — F33.41 RECURRENT MAJOR DEPRESSIVE DISORDER, IN PARTIAL REMISSION (CMS-HCC): Primary | ICD-10-CM

## 2024-09-03 DIAGNOSIS — F41.9 ANXIETY: ICD-10-CM

## 2024-09-03 NOTE — TELEPHONE ENCOUNTER
Leona denied because you put it under anxiety; its used to treat depression per insurance company; needs alternative if anxiety is what its supposed to be used for.

## 2024-10-21 DIAGNOSIS — M47.22 OSTEOARTHRITIS OF SPINE WITH RADICULOPATHY, CERVICAL REGION: ICD-10-CM

## 2024-10-21 RX ORDER — PREGABALIN 100 MG/1
100 CAPSULE ORAL 3 TIMES DAILY
Qty: 90 CAPSULE | Refills: 5 | Status: SHIPPED | OUTPATIENT
Start: 2024-10-21

## 2024-11-04 ENCOUNTER — APPOINTMENT (OUTPATIENT)
Dept: PRIMARY CARE | Facility: CLINIC | Age: 34
End: 2024-11-04
Payer: MEDICAID

## 2024-11-04 VITALS
DIASTOLIC BLOOD PRESSURE: 76 MMHG | SYSTOLIC BLOOD PRESSURE: 121 MMHG | BODY MASS INDEX: 29.73 KG/M2 | HEIGHT: 66 IN | WEIGHT: 185 LBS | HEART RATE: 111 BPM

## 2024-11-04 DIAGNOSIS — F32.9 REACTIVE DEPRESSION: Primary | ICD-10-CM

## 2024-11-04 DIAGNOSIS — M54.2 CERVICALGIA: ICD-10-CM

## 2024-11-04 PROCEDURE — 3008F BODY MASS INDEX DOCD: CPT | Performed by: INTERNAL MEDICINE

## 2024-11-04 PROCEDURE — 1036F TOBACCO NON-USER: CPT | Performed by: INTERNAL MEDICINE

## 2024-11-04 PROCEDURE — 99214 OFFICE O/P EST MOD 30 MIN: CPT | Performed by: INTERNAL MEDICINE

## 2024-11-04 RX ORDER — DULOXETIN HYDROCHLORIDE 20 MG/1
20 CAPSULE, DELAYED RELEASE ORAL DAILY
Qty: 30 CAPSULE | Refills: 2 | Status: SHIPPED | OUTPATIENT
Start: 2024-11-04 | End: 2025-05-03

## 2024-11-04 ASSESSMENT — ENCOUNTER SYMPTOMS: DEPRESSION: 1

## 2024-11-04 NOTE — PROGRESS NOTES
"Subjective   Patient ID: Antionette Curtis is a 34 y.o. female who presents for Follow-up (2 month) and Depression (Stopped all her medications; states she is a clean slate and wants to start over).  Depression    Patient is here today for 2 mo follow up     Last apt when I had seen patient her son had just had brain surgery and was still in the hospital. He is doing pretty well. Has had a few seizures since he's been home but is otherwise doing well.     Pt has stopped taking the vrylar, doxepin, and sertraline.   She has been off them since the beginning of October.   Reports that she still has a lot of anxiety, has had a lot of crying spells.   He felt like it made her too cloudy minded.     Review of Systems   Psychiatric/Behavioral:  Positive for depression.        Objective   /76   Pulse (!) 111   Ht 1.676 m (5' 6\")   Wt 83.9 kg (185 lb)   BMI 29.86 kg/m²     Physical Exam  Constitutional:       General: She is not in acute distress.     Appearance: Normal appearance.   Neurological:      Mental Status: She is alert.   Psychiatric:         Mood and Affect: Mood normal.         Behavior: Behavior normal.         Thought Content: Thought content normal.           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  Anxiety, uncontrolled    - has tried zoloft, vrylar, doxepin, buspar (gave headaches) continue zoloft, wellbutrin  - declines " therapy referral, did encourage her to think about it as she certainly has a lot of trauma in her life   - will try Cymbalta 20mg po daily      3. Follow up in 2 mo     Final diagnoses:   [F32.9] Reactive depression   [M54.2] Cervicalgia

## 2024-11-13 DIAGNOSIS — M25.50 ARTHRALGIA, UNSPECIFIED JOINT: ICD-10-CM

## 2024-11-13 RX ORDER — CYCLOBENZAPRINE HCL 10 MG
10 TABLET ORAL 3 TIMES DAILY PRN
Qty: 90 TABLET | Refills: 3 | Status: SHIPPED | OUTPATIENT
Start: 2024-11-13

## 2025-01-06 ENCOUNTER — APPOINTMENT (OUTPATIENT)
Dept: PRIMARY CARE | Facility: CLINIC | Age: 35
End: 2025-01-06
Payer: MEDICAID

## 2025-01-06 DIAGNOSIS — M25.50 ARTHRALGIA, UNSPECIFIED JOINT: ICD-10-CM

## 2025-01-06 DIAGNOSIS — M47.22 OSTEOARTHRITIS OF SPINE WITH RADICULOPATHY, CERVICAL REGION: ICD-10-CM

## 2025-01-06 DIAGNOSIS — K21.9 GASTROESOPHAGEAL REFLUX DISEASE WITHOUT ESOPHAGITIS: ICD-10-CM

## 2025-01-06 DIAGNOSIS — F32.9 REACTIVE DEPRESSION: Primary | ICD-10-CM

## 2025-01-06 PROCEDURE — 99213 OFFICE O/P EST LOW 20 MIN: CPT | Performed by: INTERNAL MEDICINE

## 2025-01-06 PROCEDURE — 1036F TOBACCO NON-USER: CPT | Performed by: INTERNAL MEDICINE

## 2025-01-06 RX ORDER — PREGABALIN 100 MG/1
100 CAPSULE ORAL 3 TIMES DAILY
Qty: 90 CAPSULE | Refills: 5 | Status: SHIPPED | OUTPATIENT
Start: 2025-01-06

## 2025-01-06 RX ORDER — OMEPRAZOLE 40 MG/1
CAPSULE, DELAYED RELEASE ORAL
Qty: 30 CAPSULE | Refills: 11 | Status: SHIPPED | OUTPATIENT
Start: 2025-01-06

## 2025-01-06 RX ORDER — DULOXETIN HYDROCHLORIDE 20 MG/1
20 CAPSULE, DELAYED RELEASE ORAL 2 TIMES DAILY
Qty: 60 CAPSULE | Refills: 3 | Status: SHIPPED | OUTPATIENT
Start: 2025-01-06 | End: 2026-01-06

## 2025-01-06 RX ORDER — CYCLOBENZAPRINE HCL 10 MG
10 TABLET ORAL 3 TIMES DAILY PRN
Qty: 90 TABLET | Refills: 3 | Status: SHIPPED | OUTPATIENT
Start: 2025-01-06

## 2025-01-06 ASSESSMENT — ENCOUNTER SYMPTOMS: ARTHRALGIAS: 1
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
## 2025-02-10 ENCOUNTER — APPOINTMENT (OUTPATIENT)
Dept: PRIMARY CARE | Facility: CLINIC | Age: 35
End: 2025-02-10
Payer: MEDICAID

## 2025-02-10 DIAGNOSIS — G89.29 CHRONIC NECK PAIN: ICD-10-CM

## 2025-02-10 DIAGNOSIS — R20.2 ARM PARESTHESIA, LEFT: Primary | ICD-10-CM

## 2025-02-10 DIAGNOSIS — F32.9 REACTIVE DEPRESSION: ICD-10-CM

## 2025-02-10 DIAGNOSIS — M54.2 CHRONIC NECK PAIN: ICD-10-CM

## 2025-02-10 PROCEDURE — 1036F TOBACCO NON-USER: CPT | Performed by: INTERNAL MEDICINE

## 2025-02-10 PROCEDURE — 99213 OFFICE O/P EST LOW 20 MIN: CPT | Performed by: INTERNAL MEDICINE

## 2025-02-10 RX ORDER — DULOXETIN HYDROCHLORIDE 20 MG/1
20 CAPSULE, DELAYED RELEASE ORAL 2 TIMES DAILY
Qty: 60 CAPSULE | Refills: 6 | Status: SHIPPED | OUTPATIENT
Start: 2025-02-10 | End: 2026-02-10

## 2025-02-10 ASSESSMENT — ENCOUNTER SYMPTOMS
NECK PAIN: 1
NERVOUS/ANXIOUS: 0

## 2025-02-10 NOTE — PROGRESS NOTES
Subjective   Patient ID: Antionette Curtis is a 35 y.o. female who presents for 1 mo follow up     HPI  Patient is here today for 1 mo follow up via telephone.    Pt and physician are at two separate locations .  Pt was verbally consented for telehealth encounter.     Pt reports that she feels like the higher dose of the Cymbalta has helped with her pain as her anxiety. She has been able to reduce her lyrica to twice a day. Keeping up with chiropractic treatments as well. No side effects with higher dose of the Cymbalta.     She reports that overall she has not felt this good in quite some time.     Review of Systems   Musculoskeletal:  Positive for neck pain.   Psychiatric/Behavioral:  The patient is not nervous/anxious.        Objective   There were no vitals taken for this visit.    Physical Exam  No physical exam was completed due     Assessment/Plan   Problem List Items Addressed This Visit       Arm paresthesia, left - Primary     Other Visit Diagnoses       Reactive depression        Relevant Medications    DULoxetine (Cymbalta) 20 mg DR capsule    Chronic neck pain               Chronic neck pain with radiculopathy, back pain with radiculopathy, improved  - works as a  so does a lot of reaching and arms outstretched which aggravates her chronic neck issues  - pt says she had EMG, AND SAW dR Rosas, declined cortisone shots   - declines pain management referral, declines PT referral   - continue flexeril   - continue lyrica 100mg po tid    - seeing chiropractor      2.  Anxiety, improved   - has tried zoloft, vrylar, doxepin, buspar (gave headaches) continue zoloft, wellbutrin  - declines therapy referral, did encourage her to think about it as she certainly has a lot of trauma in her life   - continue cymbalta  40mg po daily      Pt aware that I am leaving  at the end of Feb 25, recommend follow up in 3 mo   Final diagnoses:   [F32.9] Reactive depression   [R20.2] Arm paresthesia, left   [M54.2,  G89.29] Chronic neck pain